# Patient Record
Sex: MALE | NOT HISPANIC OR LATINO
[De-identification: names, ages, dates, MRNs, and addresses within clinical notes are randomized per-mention and may not be internally consistent; named-entity substitution may affect disease eponyms.]

---

## 2021-01-27 ENCOUNTER — TRANSCRIPTION ENCOUNTER (OUTPATIENT)
Age: 31
End: 2021-01-27

## 2022-03-24 PROBLEM — Z00.00 ENCOUNTER FOR PREVENTIVE HEALTH EXAMINATION: Status: ACTIVE | Noted: 2022-03-24

## 2022-04-19 ENCOUNTER — APPOINTMENT (OUTPATIENT)
Dept: OTOLARYNGOLOGY | Facility: CLINIC | Age: 32
End: 2022-04-19
Payer: COMMERCIAL

## 2022-04-19 VITALS
DIASTOLIC BLOOD PRESSURE: 74 MMHG | BODY MASS INDEX: 30.07 KG/M2 | HEIGHT: 69 IN | SYSTOLIC BLOOD PRESSURE: 127 MMHG | WEIGHT: 203 LBS | HEART RATE: 50 BPM

## 2022-04-19 VITALS — TEMPERATURE: 93.7 F

## 2022-04-19 DIAGNOSIS — Z78.9 OTHER SPECIFIED HEALTH STATUS: ICD-10-CM

## 2022-04-19 DIAGNOSIS — R59.0 LOCALIZED ENLARGED LYMPH NODES: ICD-10-CM

## 2022-04-19 DIAGNOSIS — Z80.41 FAMILY HISTORY OF MALIGNANT NEOPLASM OF OVARY: ICD-10-CM

## 2022-04-19 PROCEDURE — 99204 OFFICE O/P NEW MOD 45 MIN: CPT

## 2022-04-19 NOTE — HISTORY OF PRESENT ILLNESS
[de-identified] : 31 yro male pt here for eval of neck mass. Pt first noticed bump in right neck about 2 months ago in Feb 2022. Seems to be fluctuating in size.  He had a stomach bug associated with fever, vomit and fatigue about 2 weeks later.  The bump did not go away after 1 month, so he went to PCP and got US done, which showed a LN. \par Today pt c/o right neck soreness and occasional dysphagia with foods and liquids, having to use more force to swallow. Voice gets hoarse when speaking for long time. Also with throat clearing. \par Denies aspiration, odynophonia or dyspnea. No fever, chills, weight loss, phlegm, PND.  \par \par US neck 3/17/2022 done at [facility] \par -Small sonographically unremarkable lymph node in the area of clinical concern (8 x 3mm)\par -Incidental note is made of ovoid hypoechoic nodule 9 x 5mm most consistent with a nonpalpable sonographically unremarkable LN

## 2022-04-19 NOTE — PHYSICAL EXAM
[de-identified] : [employer] IRAM bryson 5 node. [Midline] : trachea located in midline position [Normal] : no rashes